# Patient Record
Sex: MALE | Race: WHITE | Employment: FULL TIME | URBAN - METROPOLITAN AREA
[De-identification: names, ages, dates, MRNs, and addresses within clinical notes are randomized per-mention and may not be internally consistent; named-entity substitution may affect disease eponyms.]

---

## 2019-02-24 ENCOUNTER — HOSPITAL ENCOUNTER (EMERGENCY)
Facility: CLINIC | Age: 54
Discharge: HOME | End: 2019-02-24
Attending: PHYSICIAN ASSISTANT | Admitting: PHYSICIAN ASSISTANT
Payer: COMMERCIAL

## 2019-02-24 VITALS
DIASTOLIC BLOOD PRESSURE: 84 MMHG | SYSTOLIC BLOOD PRESSURE: 131 MMHG | TEMPERATURE: 97.7 F | RESPIRATION RATE: 16 BRPM | OXYGEN SATURATION: 95 %

## 2019-02-24 DIAGNOSIS — J06.9 VIRAL URI WITH COUGH: ICD-10-CM

## 2019-02-24 DIAGNOSIS — K13.70 ORAL MUCOSAL LESION: ICD-10-CM

## 2019-02-24 PROCEDURE — 99202 OFFICE O/P NEW SF 15 MIN: CPT | Mod: Z6 | Performed by: PHYSICIAN ASSISTANT

## 2019-02-24 PROCEDURE — G0463 HOSPITAL OUTPT CLINIC VISIT: HCPCS | Performed by: PHYSICIAN ASSISTANT

## 2019-02-24 RX ORDER — MULTIPLE VITAMINS W/ MINERALS TAB 9MG-400MCG
1 TAB ORAL DAILY
COMMUNITY

## 2019-02-24 RX ORDER — LISINOPRIL 10 MG/1
10 TABLET ORAL DAILY
COMMUNITY

## 2019-02-24 NOTE — ED PROVIDER NOTES
History     Chief Complaint   Patient presents with     URI     has sore in mouth also     HPI  Zeeshan Shah Jr. is a 53 year old male who presents to the urgent care with concern over sore that he noted on the left side of his mouth several days ago.  Since then he also developed subjective fever, chills, myalgias, mild nasal congestion, cough, shortness of breath and wheezing.  He host not had any significant pain with the sore but states that he can feel it present with his tongue.  He denies any  Nausea, vomiting, diarrhea or abdominal pain.  He has not attempted any OTC treatments.  He denies any prior history of asthma, COPD or other breathing related disorders.  He is a former smoker who quit  4 years ago.  Approximately  58 pack year smoking history.      Allergies:  Allergies   Allergen Reactions     Penicillins Unknown     Problem List:    There are no active problems to display for this patient.     Past Medical History:    No past medical history on file.    Past Surgical History:    No past surgical history on file.    Family History:    No family history on file.    Social History:  Marital Status:    Social History     Tobacco Use     Smoking status: Not on file   Substance Use Topics     Alcohol use: Not on file     Drug use: Not on file      Medications:      cholecalciferol (VITAMIN D3) 1000 units (25 mcg) capsule   lisinopril (PRINIVIL/ZESTRIL) 10 MG tablet   multivitamin w/minerals (MULTI-VITAMIN) tablet     Review of Systems  CONSTITUTIONAL:POSITIVE  for subjective fever, chills, myalgias   INTEGUMENTARY/SKIN: NEGATIVE for worrisome rashes, moles or lesions  EYES: NEGATIVE for vision changes or irritation  ENT/MOUTH: POSITIVE for nasal congestion, sore throat and oral lesion NEGATIVE for ear pain  RESP:POSITIVE for cough, shortness of breath, wheezing   GI: NEGATIVE for abdominal pain, diarrhea, nausea and vomiting  Physical Exam   BP: 131/84  Heart Rate: 71  Temp: 97.7  F (36.5  C)  Resp:  16  SpO2: 95 %  Physical Exam  GENERAL APPEARANCE: healthy, alert and no distress  EYES: EOMI,  PERRL, conjunctiva clear  HENT: ear canals and TM's normal.  Nasal mucosa moist.  Posterior pharynx is nonerythematous without exudate.  There is a small half centimeter linear white lesion just posterior to the gums on lingual side of the left side of the mouth, that does not remove with scraping  NECK: supple, nontender, no lymphadenopathy  RESP: lungs clear to auscultation - no rales, rhonchi or wheezes  CV: regular rates and rhythm, normal S1 S2, no murmur noted  SKIN: no suspicious lesions or rashes  ED Course        Procedures        Critical Care time:  none        No results found for this or any previous visit (from the past 24 hour(s)).  Medications - No data to display    Assessments & Plan (with Medical Decision Making)     I have reviewed the nursing notes.    I have reviewed the findings, diagnosis, plan and need for follow up with the patient.          Medication List      There are no discharge medications for this visit.       Final diagnoses:   Viral URI with cough   Oral mucosal lesion     53-year-old male presents to the urgent care with concern over oral lesion noted several days ago accompanied by subjective fever, chills, myalgias, nasal congestion, cough.  He had stable vital signs upon arrival.  Physical exam findings as described above were significant for a small half centimeter linear lesion on the left side of the mouth which did not remove with scraping.  Given smoking history this is concerning for possible neoplastic process.  Differential would also include candidal infection.  His cough symptoms are most consistent with viral URI.  I do not suspect bacterial sinusitis, pneumonia, pertussis. Would consider possibility of influenza.  He was  discharged home stable with referral to ENT for evaluation of oral lesion.  Worrisome reasons to return to the ER/UC sooner discussed.     Disclaimer:  This note consists of symbols derived from keyboarding, dictation, and/or voice recognition software. As a result, there may be errors in the script that have gone undetected.  Please consider this when interpreting information found in the chart.      2/24/2019   [facility] EMERGENCY DEPARTMENT     Shilpa Hall PA-C  02/25/19 0348

## 2019-02-24 NOTE — ED AVS SNAPSHOT
[facility] Emergency Department  [address] [address]  Phone:  [phone]  Fax:  [phone]                                    Zeeshan Shah Jr.   MRN: [id number]    Department:  [facility] Emergency Department   Date of Visit:  2/24/2019           After Visit Summary Signature Page    I have received my discharge instructions, and my questions have been answered. I have discussed any challenges I see with this plan with the nurse or doctor.    ..........................................................................................................................................  Patient/Patient Representative Signature      ..........................................................................................................................................  Patient Representative Print Name and Relationship to Patient    ..................................................               ................................................  Date                                   Time    ..........................................................................................................................................  Reviewed by Signature/Title    ...................................................              ..............................................  Date                                               Time          22EPIC Rev 08/18